# Patient Record
Sex: MALE | Race: WHITE | NOT HISPANIC OR LATINO | RURAL
[De-identification: names, ages, dates, MRNs, and addresses within clinical notes are randomized per-mention and may not be internally consistent; named-entity substitution may affect disease eponyms.]

---

## 2021-01-04 ENCOUNTER — LAB OUTSIDE AN ENCOUNTER (OUTPATIENT)
Dept: RURAL CLINIC 4 | Facility: CLINIC | Age: 76
End: 2021-01-04

## 2021-01-04 ENCOUNTER — OFFICE VISIT (OUTPATIENT)
Dept: RURAL CLINIC 4 | Facility: CLINIC | Age: 76
End: 2021-01-04
Payer: MEDICARE

## 2021-01-04 ENCOUNTER — WEB ENCOUNTER (OUTPATIENT)
Dept: RURAL CLINIC 4 | Facility: CLINIC | Age: 76
End: 2021-01-04

## 2021-01-04 ENCOUNTER — DASHBOARD ENCOUNTERS (OUTPATIENT)
Age: 76
End: 2021-01-04

## 2021-01-04 DIAGNOSIS — K59.09 CHRONIC CONSTIPATION: ICD-10-CM

## 2021-01-04 DIAGNOSIS — G47.33 OSA ON CPAP: ICD-10-CM

## 2021-01-04 DIAGNOSIS — K64.8 INTERNAL HEMORRHOID, BLEEDING: ICD-10-CM

## 2021-01-04 DIAGNOSIS — K92.1 RECTAL BLEEDING: ICD-10-CM

## 2021-01-04 PROBLEM — 78275009 OBSTRUCTIVE SLEEP APNEA SYNDROME: Status: ACTIVE | Noted: 2021-01-04

## 2021-01-04 PROCEDURE — G9903 PT SCRN TBCO ID AS NON USER: HCPCS | Performed by: INTERNAL MEDICINE

## 2021-01-04 PROCEDURE — G8417 CALC BMI ABV UP PARAM F/U: HCPCS | Performed by: INTERNAL MEDICINE

## 2021-01-04 PROCEDURE — 3017F COLORECTAL CA SCREEN DOC REV: CPT | Performed by: INTERNAL MEDICINE

## 2021-01-04 PROCEDURE — G8427 DOCREV CUR MEDS BY ELIG CLIN: HCPCS | Performed by: INTERNAL MEDICINE

## 2021-01-04 PROCEDURE — G8482 FLU IMMUNIZE ORDER/ADMIN: HCPCS | Performed by: INTERNAL MEDICINE

## 2021-01-04 PROCEDURE — 99204 OFFICE O/P NEW MOD 45 MIN: CPT | Performed by: INTERNAL MEDICINE

## 2021-01-04 RX ORDER — HYDROCHLOROTHIAZIDE 25 MG/1
1 TABLET IN THE MORNING TABLET ORAL ONCE A DAY
Status: DISCONTINUED | COMMUNITY

## 2021-01-04 RX ORDER — POTASSIUM CHLORIDE 1500 MG/1
1 TABLET WITH FOOD TABLET, EXTENDED RELEASE ORAL ONCE A DAY
Status: DISCONTINUED | COMMUNITY

## 2021-01-04 RX ORDER — EMPAGLIFLOZIN 25 MG/1
1 TABLET TABLET, FILM COATED ORAL ONCE A DAY
Status: DISCONTINUED | COMMUNITY

## 2021-01-04 RX ORDER — MUPIROCIN 20 MG/G
1 APPLICATION OINTMENT TOPICAL THREE TIMES A DAY
Status: DISCONTINUED | COMMUNITY

## 2021-01-04 RX ORDER — FUROSEMIDE 40 MG/1
1 TABLET TABLET ORAL ONCE A DAY
Status: DISCONTINUED | COMMUNITY

## 2021-01-04 RX ORDER — PRAMIPEXOLE DIHYDROCHLORIDE 1.5 MG/1
TAKE 1 TABLET (1.5 MG) BY ORAL ROUTE 3 TIMES PER DAY TABLET ORAL
Qty: 0 | Refills: 0 | Status: ACTIVE | COMMUNITY
Start: 1900-01-01

## 2021-01-04 RX ORDER — VALSARTAN 320 MG/1
TAKE 1 TABLET (320 MG) BY ORAL ROUTE ONCE DAILY TABLET ORAL 1
Qty: 0 | Refills: 0 | Status: DISCONTINUED | COMMUNITY
Start: 1900-01-01

## 2021-01-04 RX ORDER — ATORVASTATIN CALCIUM 20 MG/1
1 TABLET TABLET, FILM COATED ORAL ONCE A DAY
Status: ACTIVE | COMMUNITY

## 2021-01-04 NOTE — HPI-TODAY'S VISIT:
jan 2021:  colonoscopy 7 yrs ago at [place] - was performed for rectal bleeding - was told everything was normal. reports hx hemorrhoid banding. recent bout of constipation. after this bleeding recurred. No family history of colon cancer / GI malignancies / IBD. no hx of colon polyps.

## 2021-02-03 ENCOUNTER — TELEPHONE ENCOUNTER (OUTPATIENT)
Dept: URBAN - METROPOLITAN AREA CLINIC 23 | Facility: CLINIC | Age: 76
End: 2021-02-03

## 2021-02-18 ENCOUNTER — OFFICE VISIT (OUTPATIENT)
Dept: RURAL MEDICAL CENTER 2 | Facility: MEDICAL CENTER | Age: 76
End: 2021-02-18

## 2021-03-01 ENCOUNTER — OFFICE VISIT (OUTPATIENT)
Dept: RURAL MEDICAL CENTER 2 | Facility: MEDICAL CENTER | Age: 76
End: 2021-03-01
Payer: MEDICARE

## 2021-03-01 DIAGNOSIS — K92.1 BLACK STOOL: ICD-10-CM

## 2021-03-01 PROCEDURE — 45378 DIAGNOSTIC COLONOSCOPY: CPT | Performed by: INTERNAL MEDICINE
